# Patient Record
Sex: FEMALE | Race: WHITE | NOT HISPANIC OR LATINO | Employment: FULL TIME | URBAN - METROPOLITAN AREA
[De-identification: names, ages, dates, MRNs, and addresses within clinical notes are randomized per-mention and may not be internally consistent; named-entity substitution may affect disease eponyms.]

---
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
## 2023-09-10 SDOH — ECONOMIC STABILITY: FOOD INSECURITY: WITHIN THE PAST 12 MONTHS, YOU WORRIED THAT YOUR FOOD WOULD RUN OUT BEFORE YOU GOT MONEY TO BUY MORE.: SOMETIMES TRUE

## 2023-09-10 SDOH — ECONOMIC STABILITY: HOUSING INSECURITY: IN THE LAST 12 MONTHS, HOW MANY PLACES HAVE YOU LIVED?: 1

## 2023-09-10 SDOH — HEALTH STABILITY: PHYSICAL HEALTH: ON AVERAGE, HOW MANY MINUTES DO YOU ENGAGE IN EXERCISE AT THIS LEVEL?: 60 MIN

## 2023-09-10 SDOH — ECONOMIC STABILITY: HOUSING INSECURITY
IN THE LAST 12 MONTHS, WAS THERE A TIME WHEN YOU DID NOT HAVE A STEADY PLACE TO SLEEP OR SLEPT IN A SHELTER (INCLUDING NOW)?: PATIENT REFUSED

## 2023-09-10 SDOH — ECONOMIC STABILITY: HOUSING INSECURITY
IN THE LAST 12 MONTHS, WAS THERE A TIME WHEN YOU DID NOT HAVE A STEADY PLACE TO SLEEP OR SLEPT IN A SHELTER (INCLUDING NOW)?: NO

## 2023-09-10 SDOH — ECONOMIC STABILITY: TRANSPORTATION INSECURITY
IN THE PAST 12 MONTHS, HAS LACK OF TRANSPORTATION KEPT YOU FROM MEETINGS, WORK, OR FROM GETTING THINGS NEEDED FOR DAILY LIVING?: NO

## 2023-09-10 SDOH — ECONOMIC STABILITY: TRANSPORTATION INSECURITY
IN THE PAST 12 MONTHS, HAS THE LACK OF TRANSPORTATION KEPT YOU FROM MEDICAL APPOINTMENTS OR FROM GETTING MEDICATIONS?: NO

## 2023-09-10 SDOH — ECONOMIC STABILITY: FOOD INSECURITY: WITHIN THE PAST 12 MONTHS, THE FOOD YOU BOUGHT JUST DIDN'T LAST AND YOU DIDN'T HAVE MONEY TO GET MORE.: SOMETIMES TRUE

## 2023-09-10 SDOH — ECONOMIC STABILITY: TRANSPORTATION INSECURITY
IN THE PAST 12 MONTHS, HAS LACK OF RELIABLE TRANSPORTATION KEPT YOU FROM MEDICAL APPOINTMENTS, MEETINGS, WORK OR FROM GETTING THINGS NEEDED FOR DAILY LIVING?: NO

## 2023-09-10 SDOH — ECONOMIC STABILITY: INCOME INSECURITY: HOW HARD IS IT FOR YOU TO PAY FOR THE VERY BASICS LIKE FOOD, HOUSING, MEDICAL CARE, AND HEATING?: HARD

## 2023-09-10 SDOH — ECONOMIC STABILITY: INCOME INSECURITY: IN THE LAST 12 MONTHS, WAS THERE A TIME WHEN YOU WERE NOT ABLE TO PAY THE MORTGAGE OR RENT ON TIME?: PATIENT REFUSED

## 2023-09-10 SDOH — HEALTH STABILITY: MENTAL HEALTH
STRESS IS WHEN SOMEONE FEELS TENSE, NERVOUS, ANXIOUS, OR CAN'T SLEEP AT NIGHT BECAUSE THEIR MIND IS TROUBLED. HOW STRESSED ARE YOU?: ONLY A LITTLE

## 2023-09-10 SDOH — HEALTH STABILITY: PHYSICAL HEALTH: ON AVERAGE, HOW MANY DAYS PER WEEK DO YOU ENGAGE IN MODERATE TO STRENUOUS EXERCISE (LIKE A BRISK WALK)?: 3 DAYS

## 2023-09-10 ASSESSMENT — SOCIAL DETERMINANTS OF HEALTH (SDOH)
HOW OFTEN DO YOU ATTEND CHURCH OR RELIGIOUS SERVICES?: NEVER
ARE YOU MARRIED, WIDOWED, DIVORCED, SEPARATED, NEVER MARRIED, OR LIVING WITH A PARTNER?: NEVER MARRIED
HOW OFTEN DO YOU GET TOGETHER WITH FRIENDS OR RELATIVES?: TWICE A WEEK
HOW HARD IS IT FOR YOU TO PAY FOR THE VERY BASICS LIKE FOOD, HOUSING, MEDICAL CARE, AND HEATING?: HARD
HOW MANY DRINKS CONTAINING ALCOHOL DO YOU HAVE ON A TYPICAL DAY WHEN YOU ARE DRINKING: 1 OR 2
DO YOU BELONG TO ANY CLUBS OR ORGANIZATIONS SUCH AS CHURCH GROUPS UNIONS, FRATERNAL OR ATHLETIC GROUPS, OR SCHOOL GROUPS?: YES
WITHIN THE PAST 12 MONTHS, YOU WORRIED THAT YOUR FOOD WOULD RUN OUT BEFORE YOU GOT THE MONEY TO BUY MORE: SOMETIMES TRUE
HOW OFTEN DO YOU GET TOGETHER WITH FRIENDS OR RELATIVES?: TWICE A WEEK
ARE YOU MARRIED, WIDOWED, DIVORCED, SEPARATED, NEVER MARRIED, OR LIVING WITH A PARTNER?: NEVER MARRIED
HOW OFTEN DO YOU ATTEND CHURCH OR RELIGIOUS SERVICES?: NEVER
HOW OFTEN DO YOU ATTENT MEETINGS OF THE CLUB OR ORGANIZATION YOU BELONG TO?: MORE THAN 4 TIMES PER YEAR
HOW OFTEN DO YOU HAVE A DRINK CONTAINING ALCOHOL: MONTHLY OR LESS
DO YOU BELONG TO ANY CLUBS OR ORGANIZATIONS SUCH AS CHURCH GROUPS UNIONS, FRATERNAL OR ATHLETIC GROUPS, OR SCHOOL GROUPS?: YES
HOW OFTEN DO YOU HAVE SIX OR MORE DRINKS ON ONE OCCASION: MONTHLY
IN A TYPICAL WEEK, HOW MANY TIMES DO YOU TALK ON THE PHONE WITH FAMILY, FRIENDS, OR NEIGHBORS?: THREE TIMES A WEEK
IN A TYPICAL WEEK, HOW MANY TIMES DO YOU TALK ON THE PHONE WITH FAMILY, FRIENDS, OR NEIGHBORS?: THREE TIMES A WEEK
HOW OFTEN DO YOU ATTENT MEETINGS OF THE CLUB OR ORGANIZATION YOU BELONG TO?: MORE THAN 4 TIMES PER YEAR

## 2023-09-10 ASSESSMENT — LIFESTYLE VARIABLES
HOW OFTEN DO YOU HAVE A DRINK CONTAINING ALCOHOL: MONTHLY OR LESS
AUDIT-C TOTAL SCORE: 3
HOW MANY STANDARD DRINKS CONTAINING ALCOHOL DO YOU HAVE ON A TYPICAL DAY: 1 OR 2
SKIP TO QUESTIONS 9-10: 0
HOW OFTEN DO YOU HAVE SIX OR MORE DRINKS ON ONE OCCASION: MONTHLY

## 2023-09-13 ENCOUNTER — OFFICE VISIT (OUTPATIENT)
Dept: MEDICAL GROUP | Facility: PHYSICIAN GROUP | Age: 23
End: 2023-09-13
Payer: COMMERCIAL

## 2023-09-13 ENCOUNTER — HOSPITAL ENCOUNTER (OUTPATIENT)
Facility: MEDICAL CENTER | Age: 23
End: 2023-09-13
Attending: NURSE PRACTITIONER
Payer: COMMERCIAL

## 2023-09-13 VITALS
HEIGHT: 67 IN | DIASTOLIC BLOOD PRESSURE: 78 MMHG | SYSTOLIC BLOOD PRESSURE: 122 MMHG | BODY MASS INDEX: 39.24 KG/M2 | OXYGEN SATURATION: 97 % | RESPIRATION RATE: 16 BRPM | TEMPERATURE: 98.1 F | HEART RATE: 78 BPM | WEIGHT: 250 LBS

## 2023-09-13 DIAGNOSIS — E66.9 OBESITY (BMI 30-39.9): ICD-10-CM

## 2023-09-13 DIAGNOSIS — Z13.228 SCREENING FOR METABOLIC DISORDER: ICD-10-CM

## 2023-09-13 DIAGNOSIS — Z76.89 ENCOUNTER TO ESTABLISH CARE: ICD-10-CM

## 2023-09-13 DIAGNOSIS — Z01.419 WELL WOMAN EXAM WITH ROUTINE GYNECOLOGICAL EXAM: ICD-10-CM

## 2023-09-13 DIAGNOSIS — Z11.3 ROUTINE SCREENING FOR STI (SEXUALLY TRANSMITTED INFECTION): ICD-10-CM

## 2023-09-13 DIAGNOSIS — Z00.00 ENCOUNTER FOR PREVENTATIVE ADULT HEALTH CARE EXAMINATION: ICD-10-CM

## 2023-09-13 DIAGNOSIS — Z12.4 CERVICAL CANCER SCREENING: ICD-10-CM

## 2023-09-13 LAB
HBA1C MFR BLD: 5.5 % (ref ?–5.8)
POCT INT CON NEG: NEGATIVE
POCT INT CON POS: POSITIVE

## 2023-09-13 PROCEDURE — 87491 CHLMYD TRACH DNA AMP PROBE: CPT

## 2023-09-13 PROCEDURE — 88175 CYTOPATH C/V AUTO FLUID REDO: CPT

## 2023-09-13 PROCEDURE — 83036 HEMOGLOBIN GLYCOSYLATED A1C: CPT | Performed by: NURSE PRACTITIONER

## 2023-09-13 PROCEDURE — 3078F DIAST BP <80 MM HG: CPT | Performed by: NURSE PRACTITIONER

## 2023-09-13 PROCEDURE — 87591 N.GONORRHOEAE DNA AMP PROB: CPT

## 2023-09-13 PROCEDURE — 99000 SPECIMEN HANDLING OFFICE-LAB: CPT | Performed by: NURSE PRACTITIONER

## 2023-09-13 PROCEDURE — 3074F SYST BP LT 130 MM HG: CPT | Performed by: NURSE PRACTITIONER

## 2023-09-13 PROCEDURE — 99385 PREV VISIT NEW AGE 18-39: CPT | Performed by: NURSE PRACTITIONER

## 2023-09-13 RX ORDER — IBUPROFEN 200 MG
200 TABLET ORAL EVERY 6 HOURS PRN
COMMUNITY

## 2023-09-13 ASSESSMENT — PATIENT HEALTH QUESTIONNAIRE - PHQ9: CLINICAL INTERPRETATION OF PHQ2 SCORE: 0

## 2023-09-13 NOTE — PROGRESS NOTES
CC:Diagnoses of Encounter to establish care, Obesity (BMI 30-39.9), Screening for metabolic disorder, Cervical cancer screening, Routine screening for STI (sexually transmitted infection), Encounter for preventative adult health care examination, and Well woman exam with routine gynecological exam were pertinent to this visit.    Cayla Ventura is a 23 y.o. female here to establish care and for a routine preventive health exam. Patient has no active complaints today.   She is attending [employer] to be a . She is also working.     Health Maintenance: Completed    Ob-Gyn/ History:    /Para:  0/0  Last Pap Smear:  never; first one today.   Gyn Surgery:  none.  Current Contraceptive Method:  none. Not currently sexually active. Last had sex with male partner 2 years ago  Last menstrual period:  2023.  Periods regular  Urinary incontinence: denies  Folate intake: none     Screening/Preventative Topics:  Advanced directive: none   Osteoporosis Screen/ DEXA: n/a; start age 65   Diabetes Screening: A1C today 5.5  AAA Screening: n/a; non smoker  Aspirin Use: none    Diet: avoid processed foods; recommend low calorie diet   Exercise: recommend aerobic/resistance training 3-5x/week   Screen for depression: PHQ-2: 0/0   Substance Abuse: denies  Safe in relationship   Sun protection used.    Cancer screening  Colorectal Cancer Screening: start age 45    Lung Cancer Screening: n/a; non smoker    Cervical Cancer Screening: first PAP today   Breast Cancer Screening: routine SBE at home; normal CBE today in clinic     Infectious disease screening  --STI Screening: gc/chlam screen today   --Practices safe sex. Not currently sexually active  --HIV Screening: declined   --Syphilis Screening: declined  --Hepatitis C Screening: declined     Patient Active Problem List    Diagnosis Date Noted    Obesity (BMI 30-39.9) 2023      Allergies:Patient has no known allergies.    Current Outpatient Medications  "  Medication Sig Dispense Refill    ibuprofen (MOTRIN) 200 MG Tab Take 200 mg by mouth every 6 hours as needed.       No current facility-administered medications for this visit.       Social History     Tobacco Use    Smoking status: Never   Vaping Use    Vaping Use: Never used   Substance Use Topics    Alcohol use: Yes     Comment: Occasionally    Drug use: Never     Social History     Social History Narrative    Not on file       Family History   Problem Relation Age of Onset    Diabetes Mother     Leukemia Mother     Arthritis Mother     Diabetes Maternal Grandmother        Review of Systems:    Constitutional: No Fevers, Chills  Eyes: No vision changes  ENT: No hearing changes  Resp: No Shortness of breath  CV: No Chest pain  GI: No Nausea/Vomiting  MSK: No weakness  Skin: No rashes  Neuro: No Headaches  Psych: No Suicidal ideations    All remaining systems reviewed and found to be negative, except as stated above.    Exam:    /78   Pulse 78   Temp 36.7 °C (98.1 °F) (Temporal)   Resp 16   Ht 1.702 m (5' 7\")   Wt 113 kg (250 lb)   SpO2 97%  Body mass index is 39.16 kg/m².    GEN:  Appears well and in no apparent distress   NECK:  Supple without adenopathy or thyromegaly  LUNGS:  Clear and equal. No wheeze, ronchi, or rales.  CV:  RRR, S1, S2. No murmur.  Pedal pulses 2+ bilaterally.  BREAST:  Symmetrical without masses. No nipple discharge.  ABD:  Soft, non-tender, non-distended, normal bowel sounds.  No hepatosplenomegaly.  :  Normal external female genitalia.  Vaginal canal clear.  Cervix appears normal, small amount of menstrual bleed noted. Specimen collected from transformation zone. Bimanual exam:  No CMT, normal size uterus without masses or tenderness; no adnexal masses or tenderness.    LABS: A1C result reviewed and discussed with the patient, questions answered.      Assessment/Plan:  1. Encounter to establish care  Healthy 22 y/o F here to establish care; no prior medical history; not " currently on prescription medication. Sexually active at age 18; last relationship ended 2 years ago. Never had PAP and we will get this done today.     2. Obesity (BMI 30-39.9)  3. Screening for metabolic disorder  Body mass index is 39.16 kg/m².  A1C 5.5  - Patient identified as having weight management issue.  Appropriate orders and counseling given.  - POCT  A1C    4. Cervical cancer screening  - THINPREP PAP, REFLEX HPV ON ASC-US AND ABOVE; Future    5. Routine screening for STI (sexually transmitted infection)  - Chlamydia/GC PCR Assoc.W/Thinprep; Future    6. Well woman exam with routine gynecological exam  Normal pelvic/breast exam today. Follow up with results via [employer]    7. Encounter for preventative adult health care examination    Patient up-to-date on preventative health maintenance examinations and vaccinations. She will let me know if interested in HPV vaccine.   Age-appropriate anticipatory guidance provided today. Counseling about diet, supplements, exercise, skin care and safe sex.    Return in about 6 months (around 3/13/2024) for A1c check.  Preventive visit in 1 year, sooner as needed for any concerns.     Please note that this dictation was created using voice recognition software. I have worked with consultants from the vendor as well as technical experts from [employer] [email or web address] to optimize the interface. I have made every reasonable attempt to correct obvious errors, but I expect that there are errors of grammar and possibly content that I did not discover before finalizing the note.

## 2023-09-14 DIAGNOSIS — Z11.3 ROUTINE SCREENING FOR STI (SEXUALLY TRANSMITTED INFECTION): ICD-10-CM

## 2023-09-14 DIAGNOSIS — Z12.4 CERVICAL CANCER SCREENING: ICD-10-CM

## 2023-09-16 LAB
C TRACH RRNA CVX QL NAA+PROBE: NEGATIVE
COMMENT NL11729A: NORMAL
CYTOLOGIST CVX/VAG CYTO: NORMAL
CYTOLOGY CVX/VAG DOC CYTO: NORMAL
CYTOLOGY CVX/VAG DOC THIN PREP: NORMAL
N GONORRHOEA RRNA CVX QL NAA+PROBE: NEGATIVE
NOTE NL11727A: NORMAL
OTHER STN SPEC: NORMAL
STAT OF ADQ CVX/VAG CYTO-IMP: NORMAL

## 2024-05-21 ENCOUNTER — DOCUMENTATION (OUTPATIENT)
Dept: HEALTH INFORMATION MANAGEMENT | Facility: OTHER | Age: 24
End: 2024-05-21

## 2024-05-21 ENCOUNTER — OFFICE VISIT (OUTPATIENT)
Dept: MEDICAL GROUP | Facility: PHYSICIAN GROUP | Age: 24
End: 2024-05-21
Payer: COMMERCIAL

## 2024-05-21 ENCOUNTER — HOSPITAL ENCOUNTER (OUTPATIENT)
Dept: RADIOLOGY | Facility: MEDICAL CENTER | Age: 24
End: 2024-05-21
Attending: NURSE PRACTITIONER
Payer: COMMERCIAL

## 2024-05-21 VITALS
HEIGHT: 67 IN | DIASTOLIC BLOOD PRESSURE: 62 MMHG | HEART RATE: 60 BPM | WEIGHT: 262 LBS | TEMPERATURE: 97.3 F | RESPIRATION RATE: 16 BRPM | OXYGEN SATURATION: 97 % | BODY MASS INDEX: 41.12 KG/M2 | SYSTOLIC BLOOD PRESSURE: 118 MMHG

## 2024-05-21 DIAGNOSIS — M54.42 ACUTE MIDLINE LOW BACK PAIN WITH BILATERAL SCIATICA: ICD-10-CM

## 2024-05-21 DIAGNOSIS — M54.41 ACUTE MIDLINE LOW BACK PAIN WITH BILATERAL SCIATICA: ICD-10-CM

## 2024-05-21 PROCEDURE — 99213 OFFICE O/P EST LOW 20 MIN: CPT | Performed by: NURSE PRACTITIONER

## 2024-05-21 PROCEDURE — 3078F DIAST BP <80 MM HG: CPT | Performed by: NURSE PRACTITIONER

## 2024-05-21 PROCEDURE — 3074F SYST BP LT 130 MM HG: CPT | Performed by: NURSE PRACTITIONER

## 2024-05-21 RX ORDER — CYCLOBENZAPRINE HCL 5 MG
5 TABLET ORAL 2 TIMES DAILY PRN
Qty: 15 TABLET | Refills: 0 | Status: SHIPPED | OUTPATIENT
Start: 2024-05-21

## 2024-05-21 RX ORDER — KETOROLAC TROMETHAMINE 30 MG/ML
30 INJECTION, SOLUTION INTRAMUSCULAR; INTRAVENOUS ONCE
Status: COMPLETED | OUTPATIENT
Start: 2024-05-21 | End: 2024-05-21

## 2024-05-21 RX ADMIN — KETOROLAC TROMETHAMINE 30 MG: 30 INJECTION, SOLUTION INTRAMUSCULAR; INTRAVENOUS at 10:58

## 2024-05-21 ASSESSMENT — ENCOUNTER SYMPTOMS
COUGH: 0
BACK PAIN: 1
SPUTUM PRODUCTION: 0
NEUROLOGICAL NEGATIVE: 1
SHORTNESS OF BREATH: 0
GASTROINTESTINAL NEGATIVE: 1
FALLS: 0
PALPITATIONS: 0
FEVER: 0
CONSTITUTIONAL NEGATIVE: 1
PSYCHIATRIC NEGATIVE: 1
EYES NEGATIVE: 1

## 2024-05-21 ASSESSMENT — PATIENT HEALTH QUESTIONNAIRE - PHQ9: CLINICAL INTERPRETATION OF PHQ2 SCORE: 0

## 2024-05-21 NOTE — PROGRESS NOTES
1. Acute midline low back pain with bilateral sciatica  Lumbar xray today shows a levoconvex curvature of the lumbar spine. disc space narrowing at the L4-5 and L5-S1 levels     - Referral to Orthopedics

## 2024-05-21 NOTE — LETTER
May 21, 2024    To Whom It May Concern:         This is confirmation that Cayla Ventura attended her scheduled appointment with Naye Bower D.N.P. on 5/21/24. She is starting diagnostic work up and treatment for her back pain. Please excuse her from work today. She may return to work tomorrow 5/22/2024 on light duty for the rest of this weak (no heavy lifting >10 lbs or excessive bending at the waist).         If you have any questions please do not hesitate to call me at the phone number listed below.    Sincerely,          JON MillerP.  [phone]

## 2024-05-21 NOTE — PROGRESS NOTES
"Subjective       CC:   Chief Complaint   Patient presents with    Back Pain     Lower/radiates down legs x 2 days        HPI:   Patient is a 24 y.o. established female patient with medical history listed below here today with new complaint of low back pain which radiates her legs. States pain is in the middle of her back and started last night as she was getting in bed. She feels the pain radiating down the back of her legs. There is no numbness, tingling or loss of sensation in her legs or back. No incontinence reported. She took ibuprofen last night and around 5 am this morning. States she got mild relief but pain came back when medication wore off. She had to leave work this morning due to the pain. She works desk job.     Patient Active Problem List   Diagnosis    Obesity (BMI 30-39.9)       History reviewed. No pertinent past medical history.     History reviewed. No pertinent surgical history.     Current Outpatient Medications on File Prior to Visit   Medication Sig Dispense Refill    ibuprofen (MOTRIN) 200 MG Tab Take 200 mg by mouth every 6 hours as needed.       No current facility-administered medications on file prior to visit.        ROS:  Review of Systems   Constitutional: Negative.  Negative for fever and malaise/fatigue.   HENT: Negative.     Eyes: Negative.    Respiratory:  Negative for cough, sputum production and shortness of breath.    Cardiovascular:  Negative for chest pain, palpitations and leg swelling.   Gastrointestinal: Negative.    Genitourinary: Negative.    Musculoskeletal:  Positive for back pain. Negative for falls.   Neurological: Negative.    Endo/Heme/Allergies: Negative.    Psychiatric/Behavioral: Negative.         Objective       Exam:  /62 (BP Location: Right arm, Patient Position: Sitting, BP Cuff Size: Large adult)   Pulse 60   Temp 36.3 °C (97.3 °F) (Temporal)   Resp 16   Ht 1.702 m (5' 7\")   Wt 119 kg (262 lb)   SpO2 97%   BMI 41.04 kg/m²  Body mass index is " 41.04 kg/m².    Physical Exam  Musculoskeletal:      Lumbar back: Spasms, tenderness and bony tenderness present. No swelling, edema, deformity or signs of trauma. Normal range of motion.        Back:            Assessment & Plan       24 y.o. female with the following -   1. Acute midline low back pain with bilateral sciatica  Mid back pain radiating down both legs since last night, some relief with oral ibuprofen. Does not recall any falls or trauma to her back. She was getting out of the bed last night when she felt her back catch. Point tenderness mid lumbar area on exam today; no swelling or deformity noted. We will get lumbar xray today to assess disc space. Treated her with IM toradol while she was here. Can add prn muscle relaxant when she gets home. She understands not take any more NSAIDs for the rest of the day, tylenol okay. Work letter provided, light duty rest of the week.  [facility]      - ketorolac (Toradol) injection 30 mg  - DX-LUMBAR SPINE-2 OR 3 VIEWS; Future  - cyclobenzaprine (FLEXERIL) 5 mg tablet; Take 1 Tablet by mouth 2 times a day as needed for Muscle Spasms or Moderate Pain.  Dispense: 15 Tablet; Refill: 0    Educated in proper administration of medication(s) ordered today including safety, possible SE, risks, benefits, rationale and alternatives to therapy.       Return if symptoms worsen or fail to improve.    Please note that this dictation was created using voice recognition software. I have made every reasonable attempt to correct obvious errors, but I expect that there are errors of grammar and possibly content that I did not discover before finalizing the note.

## 2024-11-04 ENCOUNTER — TELEPHONE (OUTPATIENT)
Dept: HEALTH INFORMATION MANAGEMENT | Facility: OTHER | Age: 24
End: 2024-11-04
Payer: COMMERCIAL

## 2025-08-07 ENCOUNTER — OFFICE VISIT (OUTPATIENT)
Dept: URGENT CARE | Facility: PHYSICIAN GROUP | Age: 25
End: 2025-08-07
Payer: COMMERCIAL

## 2025-08-07 VITALS
BODY MASS INDEX: 43.87 KG/M2 | SYSTOLIC BLOOD PRESSURE: 126 MMHG | WEIGHT: 273 LBS | TEMPERATURE: 97.3 F | OXYGEN SATURATION: 96 % | RESPIRATION RATE: 18 BRPM | HEIGHT: 66 IN | HEART RATE: 90 BPM | DIASTOLIC BLOOD PRESSURE: 70 MMHG
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
## 2025-08-07 DIAGNOSIS — S40.862A INSECT BITE OF LEFT UPPER ARM WITH LOCAL REACTION, INITIAL ENCOUNTER: ICD-10-CM

## 2025-08-07 PROCEDURE — 99213 OFFICE O/P EST LOW 20 MIN: CPT | Performed by: NURSE PRACTITIONER

## 2025-08-07 PROCEDURE — 3078F DIAST BP <80 MM HG: CPT | Performed by: NURSE PRACTITIONER

## 2025-08-07 PROCEDURE — 3074F SYST BP LT 130 MM HG: CPT | Performed by: NURSE PRACTITIONER

## 2025-08-07 RX ORDER — HYDROXYZINE HYDROCHLORIDE 25 MG/1
25 TABLET, FILM COATED ORAL EVERY 6 HOURS PRN
Qty: 30 TABLET | Refills: 0 | Status: SHIPPED | OUTPATIENT
Start: 2025-08-07

## 2025-08-07 RX ORDER — TRIAMCINOLONE ACETONIDE 1 MG/G
1 CREAM TOPICAL 2 TIMES DAILY
Qty: 28.4 G | Refills: 0 | Status: SHIPPED | OUTPATIENT
Start: 2025-08-07 | End: 2025-08-14

## 2025-08-07 RX ORDER — LIDOCAINE 50 MG/G
OINTMENT TOPICAL
Qty: 30 G | Refills: 0 | Status: SHIPPED | OUTPATIENT
Start: 2025-08-07

## 2025-08-07 ASSESSMENT — VISUAL ACUITY: OU: 1

## 2025-08-07 ASSESSMENT — ENCOUNTER SYMPTOMS
FEVER: 0
CONSTITUTIONAL NEGATIVE: 1